# Patient Record
Sex: FEMALE | URBAN - NONMETROPOLITAN AREA
[De-identification: names, ages, dates, MRNs, and addresses within clinical notes are randomized per-mention and may not be internally consistent; named-entity substitution may affect disease eponyms.]

---

## 2023-11-01 ENCOUNTER — APPOINTMENT (RX ONLY)
Dept: URBAN - NONMETROPOLITAN AREA CLINIC 1 | Facility: CLINIC | Age: 33
Setting detail: DERMATOLOGY
End: 2023-11-01

## 2023-11-01 DIAGNOSIS — D22 MELANOCYTIC NEVI: ICD-10-CM | Status: STABLE

## 2023-11-01 PROBLEM — D22.4 MELANOCYTIC NEVI OF SCALP AND NECK: Status: ACTIVE | Noted: 2023-11-01

## 2023-11-01 PROBLEM — D22.39 MELANOCYTIC NEVI OF OTHER PARTS OF FACE: Status: ACTIVE | Noted: 2023-11-01

## 2023-11-01 PROCEDURE — 99202 OFFICE O/P NEW SF 15 MIN: CPT

## 2023-11-01 PROCEDURE — ? COUNSELING

## 2023-11-01 ASSESSMENT — LOCATION DETAILED DESCRIPTION DERM
LOCATION DETAILED: RIGHT CENTRAL FRONTAL SCALP
LOCATION DETAILED: RIGHT MEDIAL MALAR CHEEK

## 2023-11-01 ASSESSMENT — LOCATION SIMPLE DESCRIPTION DERM
LOCATION SIMPLE: SCALP
LOCATION SIMPLE: RIGHT CHEEK

## 2023-11-01 ASSESSMENT — LOCATION ZONE DERM
LOCATION ZONE: SCALP
LOCATION ZONE: FACE

## 2023-11-01 NOTE — PROCEDURE: REASSURANCE
Additional Note: Offered patient removal. Pt declined at this time.
Hide Additional Notes?: No
Detail Level: Zone

## 2025-01-29 ENCOUNTER — OFFICE VISIT (OUTPATIENT)
Dept: UROGYNECOLOGY | Age: 35
End: 2025-01-29

## 2025-01-29 VITALS — HEIGHT: 62 IN | WEIGHT: 204 LBS | BODY MASS INDEX: 37.54 KG/M2

## 2025-01-29 DIAGNOSIS — N81.89 WEAKNESS OF PELVIC FLOOR: ICD-10-CM

## 2025-01-29 DIAGNOSIS — N39.41 URGE INCONTINENCE: Primary | ICD-10-CM

## 2025-01-29 DIAGNOSIS — N39.3 SUI (STRESS URINARY INCONTINENCE, FEMALE): ICD-10-CM

## 2025-01-29 LAB
BILIRUBIN, URINE, POC: NEGATIVE
BLOOD URINE, POC: NORMAL
GLUCOSE URINE, POC: NEGATIVE
KETONES, URINE, POC: NEGATIVE
LEUKOCYTE ESTERASE, URINE, POC: NEGATIVE
NITRITE, URINE, POC: NEGATIVE
PH, URINE, POC: 5.5 (ref 4.6–8)
PROTEIN,URINE, POC: NEGATIVE
SPECIFIC GRAVITY, URINE, POC: 1.03 (ref 1–1.03)
URINALYSIS CLARITY, POC: CLEAR
URINALYSIS COLOR, POC: YELLOW
UROBILINOGEN, POC: NORMAL MG/DL

## 2025-01-29 PROCEDURE — 81002 URINALYSIS NONAUTO W/O SCOPE: CPT | Performed by: OBSTETRICS & GYNECOLOGY

## 2025-01-29 PROCEDURE — 51701 INSERT BLADDER CATHETER: CPT | Performed by: OBSTETRICS & GYNECOLOGY

## 2025-01-29 PROCEDURE — 99401 PREV MED CNSL INDIV APPRX 15: CPT | Performed by: OBSTETRICS & GYNECOLOGY

## 2025-01-29 PROCEDURE — 99204 OFFICE O/P NEW MOD 45 MIN: CPT | Performed by: OBSTETRICS & GYNECOLOGY

## 2025-01-29 RX ORDER — LOSARTAN POTASSIUM AND HYDROCHLOROTHIAZIDE 12.5; 5 MG/1; MG/1
1 TABLET ORAL EVERY MORNING
COMMUNITY

## 2025-01-29 NOTE — PATIENT INSTRUCTIONS
We spent > 15 minutes discussing weight loss. We discussed the role that weight and BMI have on bladder function.  Research has shown that 8% weight loss can reduce incontinence rates by 50%.     She is going to the gym and trying to eat less. She has lost about 20 lbs.     We discussed the importance of a high lean- protein diet. Examples of protein rich foods was provided. I also discussed the importance of incorporating weight bearing exercises into her weekly routine.     RISKS FOR WEIGHT GAIN  Physiologic Factors: aging, slower basal metabolic rate (BMR), hormonal fluctuations, and metabolic disorders. Decrease in lean muscle mass, secondary causes (hypothyroidism, PCOS).  Hormonal Factors: Hypoestrogenemia, Hyperandrogenemia  Modifiable Factors:Excessive Calories, low dietary fiber intake, excessive sugar-sweetened beverages-SBB intake, processed and convenience food, skipping meals, large portion size, vit d deficiency, low physical activity, sedentary behavior, irregular sleep, smoking and alcohol consumption.     Steps for Management:  1) Low calorie diet and increase of physical activity. Calorie breakdown: 25% protein, 25% fat, 50% carbohydrates  2) 25-30g of fiber daily  3) 2L of water daily  4) Limit the intake of salt, sugar, and processed foods  5) Split up calories between 5-6 small meals throughout the day  6) Avoid spicey foods, alcohol, and caffeine  to get relief of hot flashes    Physical Activity: This has to be progressive, meaning increasing intensity, length or complexity over time.     Helps to achieve a negative calorie balance and relieves vasomotor symptoms.  Resistance training helps to maintain bone and muscle mass    1) Aerobic Exercise 150 min/week  (2.5 hours) of moderate intensity such as walking, swimming, jogging, dancing or cycling.   AND  2) Resistance training 2-3 times per week (resistance bands, weights or body weight)    Step 1: Aging makes the body less flexible. Start

## 2025-01-29 NOTE — ASSESSMENT & PLAN NOTE
We discussed the purpose of physical therapy which is to strengthen the pelvic floor muscles and teach proper coordination of those muscles. I described the anatomy of those muscles involved and their relationship to the end-organs in the pelvis. I described therapy techniques which include a combination of therapeutic exercise, biofeedback, neuromuscular re-education, home programs, and electrical stimulation, as well as therapeutic massage and ultrasound for pain.    I recommend Physical therapy and will send a referral.

## 2025-01-29 NOTE — ASSESSMENT & PLAN NOTE
We discussed the differential diagnosis of urinary incontinence. She is aware that women leak urine for multiple different reasons. Many women have more than one type of urinary incontinence. We also discussed the pathogenesis and etiology of stress urinary incontinence. I explained the epidemiology of incontinence. I offered her options which include nothing, dietary modifications, physical therapy, barrier treatment, in-office procedures and surgery.     My recommendations:  Please eliminate bladder irritants. This includes caffeine, artificial sweeteners, carbonated beverages, alcohol, tomato based products, citrus and spicy foods.   I highly recommend pelvic floor physical therapy.   We discussed weight loss for this as well.   UUI> abisai so we will focus on UUI first.

## 2025-01-29 NOTE — ASSESSMENT & PLAN NOTE
We discussed the differential diagnosis of urinary urgency/ urge incontinence. She is aware that women leak urine for many different reasons. We discussed the epidemiology, pathogenesis, and etiology of bladder overactivity including the neurophysiologic axis.  We also discussed the treatment pathway for UUI/OAB. I offered options which include nothing, dietary modifications, medications, physical therapy, behavior modification, botox injections and neuromodulation.      My recommendations:  Please eliminate bladder irritants. This includes caffeine, artificial sweeteners, carbonated beverages, alcohol, tomato based products, citrus and spicy foods.   She drinks 2-3 caffeine drinks beverages per day. 2.5 cups coffee, 1 soda 2-3 times a week.  She uses 2 pumps artificial sweeteners per day.  She drinks 0 alcoholic beverages per week.   I highly recommend pelvic floor physical therapy.   We discussed weight loss  We briefly discussed 2nd and 3rd line therapy. I would like to work conservatively with our mgmt if we can especially due to her age.

## 2025-01-29 NOTE — PROGRESS NOTES
We discussed the importance of a high lean- protein diet. Examples of protein rich foods was provided. I also discussed the importance of incorporating weight bearing exercises into her weekly routine.     RISKS FOR WEIGHT GAIN  Physiologic Factors: aging, slower basal metabolic rate (BMR), hormonal fluctuations, and metabolic disorders. Decrease in lean muscle mass, secondary causes (hypothyroidism, PCOS).  Hormonal Factors: Hypoestrogenemia, Hyperandrogenemia  Modifiable Factors:Excessive Calories, low dietary fiber intake, excessive sugar-sweetened beverages-SBB intake, processed and convenience food, skipping meals, large portion size, vit d deficiency, low physical activity, sedentary behavior, irregular sleep, smoking and alcohol consumption.     Steps for Management:  1) Low calorie diet and increase of physical activity. Calorie breakdown: 25% protein, 25% fat, 50% carbohydrates  2) 25-30g of fiber daily  3) 2L of water daily  4) Limit the intake of salt, sugar, and processed foods  5) Split up calories between 5-6 small meals throughout the day  6) Avoid spicey foods, alcohol, and caffeine  to get relief of hot flashes    Physical Activity: This has to be progressive, meaning increasing intensity, length or complexity over time.     Helps to achieve a negative calorie balance and relieves vasomotor symptoms.  Resistance training helps to maintain bone and muscle mass    1) Aerobic Exercise 150 min/week  (2.5 hours) of moderate intensity such as walking, swimming, jogging, dancing or cycling.   AND  2) Resistance training 2-3 times per week (resistance bands, weights or body weight)    Step 1: Aging makes the body less flexible. Start with 10 min of stretching or walking. This warms up the body by gradually increasing physical activity, joint mobility, and stretching.   Step 2: Aerobic exercise (walking, swimming, cycling, jogging, running, dancing) Start off with a short duration but increase complexity

## 2025-07-11 ENCOUNTER — APPOINTMENT (OUTPATIENT)
Dept: URBAN - NONMETROPOLITAN AREA CLINIC 1 | Facility: CLINIC | Age: 35
Setting detail: DERMATOLOGY
End: 2025-07-11

## 2025-07-11 DIAGNOSIS — D22 MELANOCYTIC NEVI: ICD-10-CM

## 2025-07-11 DIAGNOSIS — D18.0 HEMANGIOMA: ICD-10-CM

## 2025-07-11 DIAGNOSIS — L82.1 OTHER SEBORRHEIC KERATOSIS: ICD-10-CM

## 2025-07-11 DIAGNOSIS — Q82.5 CONGENITAL NON-NEOPLASTIC NEVUS: ICD-10-CM

## 2025-07-11 DIAGNOSIS — L81.4 OTHER MELANIN HYPERPIGMENTATION: ICD-10-CM

## 2025-07-11 PROBLEM — D22.5 MELANOCYTIC NEVI OF TRUNK: Status: ACTIVE | Noted: 2025-07-11

## 2025-07-11 PROBLEM — D18.01 HEMANGIOMA OF SKIN AND SUBCUTANEOUS TISSUE: Status: ACTIVE | Noted: 2025-07-11

## 2025-07-11 PROBLEM — D23.71 OTHER BENIGN NEOPLASM OF SKIN OF RIGHT LOWER LIMB, INCLUDING HIP: Status: ACTIVE | Noted: 2025-07-11

## 2025-07-11 PROCEDURE — ? COUNSELING

## 2025-07-11 ASSESSMENT — LOCATION SIMPLE DESCRIPTION DERM
LOCATION SIMPLE: LEFT UPPER BACK
LOCATION SIMPLE: RIGHT UPPER BACK
LOCATION SIMPLE: SCALP
LOCATION SIMPLE: UPPER BACK

## 2025-07-11 ASSESSMENT — LOCATION ZONE DERM
LOCATION ZONE: SCALP
LOCATION ZONE: TRUNK

## 2025-07-11 ASSESSMENT — LOCATION DETAILED DESCRIPTION DERM
LOCATION DETAILED: INFERIOR THORACIC SPINE
LOCATION DETAILED: RIGHT MEDIAL UPPER BACK
LOCATION DETAILED: RIGHT CENTRAL PARIETAL SCALP
LOCATION DETAILED: LEFT MEDIAL UPPER BACK
LOCATION DETAILED: LEFT INFERIOR UPPER BACK

## 2025-07-11 NOTE — HPI: EVALUATION OF SKIN LESION(S)
What Type Of Note Output Would You Prefer (Optional)?: Bullet Format
Hpi Title: Evaluation of a Skin Lesion
How Severe Are Your Spot(S)?: mild
Have Your Spot(S) Been Treated In The Past?: has not been treated
Additional History: Patient is concerned about on nevus on her back/axilla area.